# Patient Record
Sex: FEMALE | Race: WHITE | NOT HISPANIC OR LATINO | Employment: OTHER | URBAN - METROPOLITAN AREA
[De-identification: names, ages, dates, MRNs, and addresses within clinical notes are randomized per-mention and may not be internally consistent; named-entity substitution may affect disease eponyms.]

---

## 2017-08-10 ENCOUNTER — TELEPHONE (OUTPATIENT)
Dept: ELECTROPHYSIOLOGY | Facility: CLINIC | Age: 82
End: 2017-08-10

## 2017-08-10 NOTE — TELEPHONE ENCOUNTER
Returned pts call and informed pt her appt is the next date that dr story will be in [place], I offered pt to come to [facility] and see heidy, she declined

## 2017-08-18 ENCOUNTER — TELEPHONE (OUTPATIENT)
Dept: CARDIOLOGY | Facility: CLINIC | Age: 82
End: 2017-08-18

## 2017-08-18 DIAGNOSIS — Z95.818 OTHER SPECIFIED CARDIAC DEVICE IN SITU: Primary | ICD-10-CM

## 2017-08-18 NOTE — TELEPHONE ENCOUNTER
Tried to call patient today ..  No answer.  Called back and was disconnected    I did calll patient's Daughter and asked her if it would be ok to change appointment form 1120 oi 100 pm for device check and 120 for Dr Dominguez.  Larry er stated it should not be a problem.

## 2017-08-18 NOTE — TELEPHONE ENCOUNTER
Left message for  Call back regarding appointment time change  Come in for 1245 pm for 1:00 pm device check and 120 pm with Dr Angelica Jerry in cardiology if you have any questions [phone]

## 2017-08-22 ENCOUNTER — TELEPHONE (OUTPATIENT)
Dept: ELECTROPHYSIOLOGY | Facility: CLINIC | Age: 82
End: 2017-08-22

## 2017-08-23 ENCOUNTER — OFFICE VISIT (OUTPATIENT)
Dept: CARDIOLOGY | Facility: CLINIC | Age: 82
End: 2017-08-23
Payer: MEDICARE

## 2017-08-23 ENCOUNTER — CLINICAL SUPPORT (OUTPATIENT)
Dept: CARDIOLOGY | Facility: CLINIC | Age: 82
End: 2017-08-23
Payer: MEDICARE

## 2017-08-23 VITALS
DIASTOLIC BLOOD PRESSURE: 90 MMHG | HEART RATE: 84 BPM | BODY MASS INDEX: 24.5 KG/M2 | SYSTOLIC BLOOD PRESSURE: 146 MMHG | WEIGHT: 138.25 LBS | HEIGHT: 63 IN

## 2017-08-23 DIAGNOSIS — R55 SYNCOPE AND COLLAPSE: ICD-10-CM

## 2017-08-23 DIAGNOSIS — Z95.818 OTHER SPECIFIED CARDIAC DEVICE IN SITU: ICD-10-CM

## 2017-08-23 DIAGNOSIS — I48.19 PERSISTENT ATRIAL FIBRILLATION: ICD-10-CM

## 2017-08-23 DIAGNOSIS — I10 ESSENTIAL HYPERTENSION: Primary | ICD-10-CM

## 2017-08-23 DIAGNOSIS — Z79.01 CURRENT USE OF LONG TERM ANTICOAGULATION: ICD-10-CM

## 2017-08-23 DIAGNOSIS — E78.2 MIXED HYPERLIPIDEMIA: ICD-10-CM

## 2017-08-23 DIAGNOSIS — Z95.818 STATUS POST PLACEMENT OF IMPLANTABLE LOOP RECORDER: ICD-10-CM

## 2017-08-23 PROCEDURE — 99214 OFFICE O/P EST MOD 30 MIN: CPT | Mod: S$PBB,,, | Performed by: INTERNAL MEDICINE

## 2017-08-23 PROCEDURE — 1126F AMNT PAIN NOTED NONE PRSNT: CPT | Mod: ,,, | Performed by: INTERNAL MEDICINE

## 2017-08-23 PROCEDURE — 93291 INTERROG DEV EVAL SCRMS IP: CPT | Mod: PBBFAC,PO | Performed by: INTERNAL MEDICINE

## 2017-08-23 PROCEDURE — 99999 PR PBB SHADOW E&M-EST. PATIENT-LVL IV: CPT | Mod: PBBFAC,,, | Performed by: INTERNAL MEDICINE

## 2017-08-23 PROCEDURE — 99214 OFFICE O/P EST MOD 30 MIN: CPT | Mod: PBBFAC,PO | Performed by: INTERNAL MEDICINE

## 2017-08-23 PROCEDURE — 1159F MED LIST DOCD IN RCRD: CPT | Mod: ,,, | Performed by: INTERNAL MEDICINE

## 2017-08-23 RX ORDER — TRAZODONE HYDROCHLORIDE 50 MG/1
50 TABLET ORAL NIGHTLY
COMMUNITY

## 2017-08-23 RX ORDER — ATORVASTATIN CALCIUM 20 MG/1
20 TABLET, FILM COATED ORAL DAILY
COMMUNITY

## 2017-08-23 RX ORDER — FUROSEMIDE 20 MG/1
20 TABLET ORAL 2 TIMES DAILY
COMMUNITY

## 2017-08-23 RX ORDER — DILTIAZEM HYDROCHLORIDE 180 MG/1
180 CAPSULE, COATED, EXTENDED RELEASE ORAL DAILY
COMMUNITY
End: 2017-08-23 | Stop reason: SDUPTHER

## 2017-08-23 RX ORDER — DILTIAZEM HYDROCHLORIDE 360 MG/1
360 CAPSULE, EXTENDED RELEASE ORAL DAILY
Qty: 90 CAPSULE | Refills: 3 | Status: SHIPPED | OUTPATIENT
Start: 2017-08-23

## 2017-08-23 RX ORDER — METOPROLOL SUCCINATE 25 MG/1
25 TABLET, EXTENDED RELEASE ORAL DAILY
COMMUNITY

## 2017-08-23 RX ORDER — CARBOXYMETHYLCELLULOSE SODIUM 5 MG/ML
SOLUTION/ DROPS OPHTHALMIC 3 TIMES DAILY PRN
COMMUNITY

## 2017-08-23 RX ORDER — LORATADINE 10 MG/1
10 TABLET ORAL DAILY
COMMUNITY

## 2017-08-23 NOTE — PROGRESS NOTES
Subjective:     HPI    Cardiologist: Stevenson Núñez MD    I had the pleasure of seeing Bailey Mcnulty in follow-up today for her history of syncope, SVT, EP study, and ILR placement. She is an 87-year old female with a past medical history of hypertension, hyperlipidemia, advanced COPD, and pulmonary hypertension, whose history of syncope dates back to 2007 when she was experienced an episode of syncope with no prodrome. She was well until 1/14/2014, when she was getting out of her car to buy groceries. As she was walking to the store, she felt herself passing out. Within 1 second, she experienced syncope and awoke on the ground with a head contusion. She denied preceding chest pain, palpitations, dizziness, lightheadedness, diaphoresis, nausea, or other symptoms immediately prior to the episode. [place] [facility] [place] work-up was performed which was unremarkable. However, during inpatient Holter monitoring on 1/15/2014 at 10:43 AM, the patient went into an SVT  ms which lasted roughly 20 seconds. I had the opportunity to review the strips and scanned them into EPIC. Single lead telemetry precluded mechanism identification (differential included atrial fibrillation with a regular ventricular response, atrial flutter with 2:1 AV block, and SVT). She was started on Sotalol 40 mg BID around this time.     Recent cardiac studies include an echocardiogram performed in 6/2013 which showed an EF of 60-65%, mild LA dilatation, moderate MR, and moderate pulmonary hypertension.     On 2/24/2014, Ms. Carrero underwent EP study, which was negative for sustained tachyarrhythmias or bradyarrhythmias. She underwent ILR by Dr. Núñez shortly thereafter.     Mrs. Carrero has been doing well since device placement. She had no further episodes of syncope. Over the years the device captured multiple episodes of nonsustained AT but no sustained arrhythmias. Sotalol was stopped in 2014 due to QT  prolongation.    Ms. Carrero was admitted to [facility] in [place]2017 with POSADA in the setting of AF with RVR. A device interrogation revealed she had been in persistent AF since around that time. Rate-control improved her symptoms. Her resting HR is currently 100-105 bpm.    Review of Systems   Constitution: Negative for decreased appetite, malaise/fatigue, weight gain and weight loss.   HENT: Negative for sore throat.    Eyes: Negative for blurred vision.   Cardiovascular: Negative for chest pain, dyspnea on exertion, irregular heartbeat, leg swelling, near-syncope, orthopnea, palpitations, paroxysmal nocturnal dyspnea and syncope.   Respiratory: Negative for shortness of breath.    Skin: Negative for rash.   Musculoskeletal: Negative for arthritis.   Gastrointestinal: Negative for abdominal pain.   Neurological: Negative for focal weakness.   Psychiatric/Behavioral: Negative for altered mental status.        Objective:    Physical Exam   Constitutional: She is oriented to person, place, and time. She appears well-developed and well-nourished. No distress.   HENT:   Head: Normocephalic and atraumatic.   Mouth/Throat: Oropharynx is clear and moist.   Eyes: Conjunctivae are normal. Pupils are equal, round, and reactive to light. No scleral icterus.   Neck: Normal range of motion. Neck supple. No JVD present. No thyromegaly present.   Cardiovascular: Normal rate, regular rhythm, normal heart sounds and intact distal pulses.  Exam reveals no gallop and no friction rub.    No murmur heard.  Pulmonary/Chest: Effort normal and breath sounds normal. No respiratory distress.   Abdominal: Soft. Bowel sounds are normal. She exhibits no distension.   Musculoskeletal: She exhibits no edema.   Neurological: She is alert and oriented to person, place, and time.   Skin: Skin is warm and dry.   Psychiatric: She has a normal mood and affect. Her behavior is normal.   Vitals reviewed.        Assessment:       1. Essential hypertension    2.  Persistent atrial fibrillation    3. Current use of long term anticoagulation    4. Mixed hyperlipidemia    5. Status post placement of implantable loop recorder    6. Syncope and collapse         Plan:     In summary, Bailey Mcnulty is an 87 year old female with asymptomatic persistent AF. She is tolerating Eliquis. The plan is to adopt a rate control strategy. I have stopped Sotalol, and doubled Cartia to 360 mg daily. She will follow-up for further rate-control locally, and will see me again as-needed.    Thank you for allowing me to participate in the care of this patient. Please do not hesitate to call me with any questions or concerns.